# Patient Record
Sex: FEMALE | Race: WHITE | NOT HISPANIC OR LATINO | Employment: FULL TIME | URBAN - METROPOLITAN AREA
[De-identification: names, ages, dates, MRNs, and addresses within clinical notes are randomized per-mention and may not be internally consistent; named-entity substitution may affect disease eponyms.]

---

## 2017-03-11 ENCOUNTER — OFFICE VISIT (OUTPATIENT)
Dept: URGENT CARE | Facility: PHYSICIAN GROUP | Age: 22
End: 2017-03-11
Payer: COMMERCIAL

## 2017-03-11 VITALS
OXYGEN SATURATION: 97 % | RESPIRATION RATE: 16 BRPM | HEART RATE: 66 BPM | WEIGHT: 120 LBS | HEIGHT: 63 IN | BODY MASS INDEX: 21.26 KG/M2 | DIASTOLIC BLOOD PRESSURE: 62 MMHG | TEMPERATURE: 98.4 F | SYSTOLIC BLOOD PRESSURE: 104 MMHG

## 2017-03-11 DIAGNOSIS — H10.31 ACUTE BACTERIAL CONJUNCTIVITIS OF RIGHT EYE: ICD-10-CM

## 2017-03-11 PROCEDURE — 99202 OFFICE O/P NEW SF 15 MIN: CPT | Performed by: NURSE PRACTITIONER

## 2017-03-11 RX ORDER — POLYMYXIN B SULFATE AND TRIMETHOPRIM 1; 10000 MG/ML; [USP'U]/ML
1 SOLUTION OPHTHALMIC EVERY 4 HOURS
Qty: 10 ML | Refills: 0 | Status: SHIPPED | OUTPATIENT
Start: 2017-03-11 | End: 2017-03-18

## 2017-03-11 ASSESSMENT — ENCOUNTER SYMPTOMS
FEVER: 0
DIZZINESS: 0
NAUSEA: 0

## 2017-03-11 ASSESSMENT — LIFESTYLE VARIABLES: SUBSTANCE_ABUSE: 0

## 2017-03-11 NOTE — PROGRESS NOTES
"Subjective:      Aletha Nunn is a 21 y.o. female who presents with Conjunctivitis      Denies past medical, surgical or family history that is significant to today's problem.   RX or OTC medications reviewed with patient today.   No Known Allergies          Conjunctivitis  This is a new problem. The current episode started yesterday. The problem has been gradually worsening. Pertinent negatives include no fever or nausea. Associated symptoms comments: Discharge from right eye, watery, crusted shut this morning. Dry eyes, mildly blurred vision in her right eye. Pain in her right eye last night.   She does not wear contacts  .       Review of Systems   Constitutional: Negative for fever.   Gastrointestinal: Negative for nausea.   Neurological: Negative for dizziness.   Psychiatric/Behavioral: Negative for substance abuse.          Objective:     /62 mmHg  Pulse 66  Temp(Src) 36.9 °C (98.4 °F)  Resp 16  Ht 1.6 m (5' 3\")  Wt 54.432 kg (120 lb)  BMI 21.26 kg/m2  SpO2 97%     Physical Exam   Constitutional: She is oriented to person, place, and time. Vital signs are normal. She appears well-developed and well-nourished.  Non-toxic appearance. She does not appear ill.   HENT:   Head: Normocephalic.   Eyes: Conjunctivae, EOM and lids are normal. Pupils are equal, round, and reactive to light. Right eye exhibits discharge. Left eye exhibits no discharge. No scleral icterus.   Neck: Normal range of motion.   Cardiovascular: Normal rate.    Neurological: She is alert and oriented to person, place, and time.   Skin: Skin is warm.   Nursing note and vitals reviewed.              Assessment/Plan:     1. Acute bacterial conjunctivitis of right eye  polymixin-trimethoprim (POLYTRIM) 20466-2.1 UNIT/ML-% Solution       Educated in proper administration of medication(s) ordered today including safety, possible SE, risks, benefits, rationale and alternatives to therapy.   Return to clinic or PCP 2-3 days if current " symptoms are not resolving in a satisfactory manner or sooner if new or worsening symptoms occur.   Patient and or family advised differential diagnoses, signs and symptoms which would warrant Emergency Department evaluation.  Verbalizes understanding of instructions.   Pt education done. Aftercare instructions given to pt/ caregiver. Questions answered. Verbalizes good understanding.   Warms compresses to affected area 2-3 times a day for 15 min.

## 2020-04-29 ENCOUNTER — OFFICE VISIT (OUTPATIENT)
Dept: URGENT CARE | Facility: CLINIC | Age: 25
End: 2020-04-29
Payer: COMMERCIAL

## 2020-04-29 VITALS
HEART RATE: 68 BPM | SYSTOLIC BLOOD PRESSURE: 100 MMHG | RESPIRATION RATE: 14 BRPM | HEIGHT: 63 IN | OXYGEN SATURATION: 97 % | DIASTOLIC BLOOD PRESSURE: 68 MMHG | BODY MASS INDEX: 23.04 KG/M2 | WEIGHT: 130 LBS | TEMPERATURE: 97.6 F

## 2020-04-29 DIAGNOSIS — J02.0 STREP PHARYNGITIS: ICD-10-CM

## 2020-04-29 LAB
INT CON NEG: NEGATIVE
INT CON POS: POSITIVE
S PYO AG THROAT QL: POSITIVE

## 2020-04-29 PROCEDURE — 87880 STREP A ASSAY W/OPTIC: CPT | Performed by: PHYSICIAN ASSISTANT

## 2020-04-29 PROCEDURE — 99204 OFFICE O/P NEW MOD 45 MIN: CPT | Performed by: PHYSICIAN ASSISTANT

## 2020-04-29 RX ORDER — AMOXICILLIN 500 MG/1
500 CAPSULE ORAL 2 TIMES DAILY
Qty: 20 CAP | Refills: 0 | Status: SHIPPED | OUTPATIENT
Start: 2020-04-29 | End: 2020-05-09

## 2020-04-29 ASSESSMENT — ENCOUNTER SYMPTOMS
MYALGIAS: 0
SHORTNESS OF BREATH: 0
FEVER: 0
NAUSEA: 0
COUGH: 0
HEADACHES: 0
ABDOMINAL PAIN: 0
DIARRHEA: 0
VOMITING: 0
EYES NEGATIVE: 1
SORE THROAT: 1
TROUBLE SWALLOWING: 0
CHILLS: 0

## 2020-04-29 NOTE — LETTER
[facility]  [facility] [place] [id number] [facility] [id number]     April 29, 2020    Patient: Aletha Nunn   YOB: 1995   Date of Visit: 4/29/2020       To Whom It May Concern:    Aletha Nunn was seen and treated in our department on 4/29/2020. Please excuse from work 04/28/20 and 4/29/20 due to medical necessity.     Sincerely,     Rey Kellogg P.A.-C.

## 2020-04-29 NOTE — PROGRESS NOTES
"Subjective:      Aletha Nunn is a 24 y.o. female who presents with Sore Throat (left side of the neck outside theres a bump on the skin x 1.5 day and the bump showed up yesterday )        Pharyngitis    This is a new problem. Episode onset: 2 days. The problem has been gradually worsening. The pain is worse on the left side. There has been no fever. The pain is at a severity of 4/10. Associated symptoms include ear pain. Pertinent negatives include no abdominal pain, congestion, coughing, diarrhea, headaches, shortness of breath, trouble swallowing or vomiting. She has tried gargles and NSAIDs for the symptoms. The treatment provided mild relief.     Family history of younger sister having thyroid cancer. Family history of skin cancer. No personal history of cancer.     Patient also mentions a small little lump to her left neck.  She noticed the lump yesterday.  She denies any pain to this area, swelling, or redness.  She says it does not bother her.      Review of Systems   Constitutional: Negative for chills, fever and malaise/fatigue.   HENT: Positive for ear pain and sore throat. Negative for congestion and trouble swallowing.    Eyes: Negative.    Respiratory: Negative for cough and shortness of breath.    Cardiovascular: Negative for chest pain.   Gastrointestinal: Negative for abdominal pain, diarrhea, nausea and vomiting.   Musculoskeletal: Negative for myalgias.   Neurological: Negative for headaches.   Endo/Heme/Allergies: Negative.           Objective:     /68   Pulse 68   Temp 36.4 °C (97.6 °F) (Temporal)   Resp 14   Ht 1.6 m (5' 3\")   Wt 59 kg (130 lb)   SpO2 97%   BMI 23.03 kg/m²      Physical Exam  Vitals signs reviewed.   Constitutional:       General: She is not in acute distress.     Appearance: Normal appearance. She is not ill-appearing or toxic-appearing.   HENT:      Right Ear: Tympanic membrane normal.      Left Ear: Tympanic membrane normal.      Mouth/Throat:      Mouth: " Mucous membranes are moist. No oral lesions.      Tongue: No lesions.      Pharynx: Uvula midline. Posterior oropharyngeal erythema present. No pharyngeal swelling, oropharyngeal exudate or uvula swelling.      Tonsils: No tonsillar exudate or tonsillar abscesses.   Neck:      Trachea: Trachea normal.     Cardiovascular:      Rate and Rhythm: Normal rate and regular rhythm.      Heart sounds: Normal heart sounds.   Pulmonary:      Effort: Pulmonary effort is normal. No respiratory distress.      Breath sounds: Normal breath sounds. No wheezing, rhonchi or rales.   Lymphadenopathy:      Cervical: Cervical adenopathy present.      Right cervical: Superficial cervical adenopathy present.      Left cervical: Superficial cervical adenopathy present.   Skin:     General: Skin is warm and dry.   Neurological:      General: No focal deficit present.      Mental Status: She is alert and oriented to person, place, and time.   Psychiatric:         Mood and Affect: Mood normal.         Behavior: Behavior normal.       Past Medical History:   Diagnosis Date   • Left eye trauma 1998    cat scratch with cellulitis, decreased vision in that eye    No past surgical history on file.   Social History     Socioeconomic History   • Marital status: Single     Spouse name: Not on file   • Number of children: Not on file   • Years of education: Not on file   • Highest education level: Not on file   Occupational History   • Not on file   Social Needs   • Financial resource strain: Not on file   • Food insecurity     Worry: Not on file     Inability: Not on file   • Transportation needs     Medical: Not on file     Non-medical: Not on file   Tobacco Use   • Smoking status: Never Smoker   • Smokeless tobacco: Never Used   Substance and Sexual Activity   • Alcohol use: No   • Drug use: No   • Sexual activity: Never     Comment: 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    Lifestyle   • Physical activity     Days per week: Not on file      Minutes per session: Not on file   • Stress: Not on file   Relationships   • Social connections     Talks on phone: Not on file     Gets together: Not on file     Attends Islam service: Not on file     Active member of club or organization: Not on file     Attends meetings of clubs or organizations: Not on file     Relationship status: Not on file   • Intimate partner violence     Fear of current or ex partner: Not on file     Emotionally abused: Not on file     Physically abused: Not on file     Forced sexual activity: Not on file   Other Topics Concern   • Not on file   Social History Narrative   • Not on file    Patient has no known allergies.            Assessment/Plan:     1. Strep pharyngitis    - POCT Rapid Strep A - Negative   - amoxicillin (AMOXIL) 500 MG Cap; Take 1 Cap by mouth 2 times a day for 10 days.  Dispense: 20 Cap; Refill: 0    Discussed with patient her signs and symptoms are consistent with strep A pharyngitis.  Discussed the small lump to her left neck most likely a swollen lymph node secondary to strep.  If lump does not resolve after resolution of strep symptoms or persist for a while or with any worsening swelling, redness or pain to the lump, return to the urgent care for re-evaluation. In addition Follow-up with PCP.    Treatment of amoxicillin for 10 days.  Salt water gargles, ibuprofen and Tylenol for pain.  Return to urgent care if symptoms are not improving or any worsening symptoms.  Present to the emergency department or call 911 if any worsening swelling of the throat, difficulty breathing, wheezing or any other concerns.    Supportive care, differential diagnoses, and indications for immediate follow-up discussed with patient.    Pathogenesis of diagnosis discussed including typical length and natural progression. Patient expresses understanding and agrees to plan.    Please note that this dictation was created using voice recognition software. I have made every reasonable  attempt to correct obvious errors, but I expect that there are errors of grammar and possibly content that I did not discover before finalizing the note.

## 2020-06-28 ENCOUNTER — OFFICE VISIT (OUTPATIENT)
Dept: URGENT CARE | Facility: PHYSICIAN GROUP | Age: 25
End: 2020-06-28

## 2020-06-28 VITALS
DIASTOLIC BLOOD PRESSURE: 62 MMHG | BODY MASS INDEX: 24.63 KG/M2 | TEMPERATURE: 96.8 F | RESPIRATION RATE: 16 BRPM | OXYGEN SATURATION: 96 % | SYSTOLIC BLOOD PRESSURE: 104 MMHG | WEIGHT: 139 LBS | HEIGHT: 63 IN | HEART RATE: 60 BPM

## 2020-06-28 DIAGNOSIS — Z02.4 ENCOUNTER FOR CDL (COMMERCIAL DRIVING LICENSE) EXAM: ICD-10-CM

## 2020-06-28 PROCEDURE — 7100 PR DOT PHYSICAL: Performed by: NURSE PRACTITIONER

## 2020-06-28 NOTE — PROGRESS NOTES
CC) Here today for dotphysical exam.   S) Reviewed History with pt.        No complaints    O) See physical exam forms and diagnostics attached to visit today.       A)   1. Encounter for CDL (commercial driving license) exam           P) as documented on physical exam forms.

## 2024-11-07 ENCOUNTER — OFFICE VISIT (OUTPATIENT)
Dept: URGENT CARE | Facility: PHYSICIAN GROUP | Age: 29
End: 2024-11-07
Payer: COMMERCIAL

## 2024-11-07 VITALS
BODY MASS INDEX: 28.83 KG/M2 | HEART RATE: 71 BPM | HEIGHT: 63 IN | DIASTOLIC BLOOD PRESSURE: 78 MMHG | OXYGEN SATURATION: 96 % | WEIGHT: 162.7 LBS | TEMPERATURE: 99 F | RESPIRATION RATE: 16 BRPM | SYSTOLIC BLOOD PRESSURE: 110 MMHG

## 2024-11-07 DIAGNOSIS — J02.9 PHARYNGITIS, UNSPECIFIED ETIOLOGY: ICD-10-CM

## 2024-11-07 LAB — S PYO DNA SPEC NAA+PROBE: NOT DETECTED

## 2024-11-07 PROCEDURE — 3078F DIAST BP <80 MM HG: CPT | Performed by: PHYSICIAN ASSISTANT

## 2024-11-07 PROCEDURE — 99203 OFFICE O/P NEW LOW 30 MIN: CPT | Performed by: PHYSICIAN ASSISTANT

## 2024-11-07 PROCEDURE — 87651 STREP A DNA AMP PROBE: CPT | Performed by: PHYSICIAN ASSISTANT

## 2024-11-07 PROCEDURE — 3074F SYST BP LT 130 MM HG: CPT | Performed by: PHYSICIAN ASSISTANT

## 2024-11-07 ASSESSMENT — ENCOUNTER SYMPTOMS
FEVER: 0
HEADACHES: 1
SHORTNESS OF BREATH: 0
SWOLLEN GLANDS: 1
COUGH: 0
STRIDOR: 0

## 2024-11-07 NOTE — LETTER
November 7, 2024         Patient: Aletha Nunn   YOB: 1995   Date of Visit: 11/7/2024           To Whom it May Concern:    Aletha Nunn was seen in my clinic on 11/7/2024.  Please excuse her absence from school tomorrow.      Sincerely,           Karissa Omalley P.A.-C.  Electronically Signed

## 2024-11-07 NOTE — PROGRESS NOTES
"Subjective     Aletha Nunn is a 29 y.o. female who presents with Sore Throat (white tonsils, pain x 2 day)      Pharyngitis   This is a new problem. The current episode started yesterday. The problem has been unchanged. There has been no fever. The pain is moderate. Associated symptoms include congestion, headaches and swollen glands. Pertinent negatives include no coughing, shortness of breath or stridor.   Patient states that her  is having similar symptoms.  She has had strep before and symptoms feel similar.    Review of Systems   Constitutional:  Negative for fever.   HENT:  Positive for congestion.    Respiratory:  Negative for cough, shortness of breath and stridor.    Neurological:  Positive for headaches.         PMH:  has a past medical history of Left eye trauma (1998).    She has no past medical history of Allergy, ASTHMA, or GERD (gastroesophageal reflux disease).  MEDS:   Current Outpatient Medications:     hydrocodone-acetaminophen (VICODIN) 5-500 MG TABS, Take  by mouth at bedtime as needed. Take 1/2 tablet-1 tablet qhs prn severe pain (Patient not taking: Reported on 11/7/2024), Disp: 10 Each, Rfl: 0  ALLERGIES: No Known Allergies  SURGHX: No past surgical history on file.  SOCHX:  reports that she has never smoked. She has never used smokeless tobacco. She reports current alcohol use. She reports current drug use. Drugs: Marijuana and Inhaled.  FH: Family history was reviewed, no pertinent findings to report        Objective     /78   Pulse 71   Temp 37.2 °C (99 °F)   Resp 16   Ht 1.6 m (5' 3\")   Wt 73.8 kg (162 lb 11.2 oz)   SpO2 96%   BMI 28.82 kg/m²      Physical Exam  Constitutional:       Appearance: She is well-developed.   HENT:      Head: Normocephalic and atraumatic.      Right Ear: Tympanic membrane, ear canal and external ear normal.      Left Ear: Tympanic membrane, ear canal and external ear normal.      Nose: Mucosal edema present. No congestion or rhinorrhea. "      Mouth/Throat:      Lips: Pink.      Mouth: Mucous membranes are moist.      Pharynx: Uvula midline. Posterior oropharyngeal erythema (mild) present. No oropharyngeal exudate.      Tonsils: No tonsillar exudate or tonsillar abscesses. 1+ on the right. 1+ on the left.   Eyes:      Conjunctiva/sclera: Conjunctivae normal.      Pupils: Pupils are equal, round, and reactive to light.   Cardiovascular:      Rate and Rhythm: Normal rate and regular rhythm.      Heart sounds: Normal heart sounds. No murmur heard.  Pulmonary:      Effort: Pulmonary effort is normal.      Breath sounds: Normal breath sounds. No wheezing.   Musculoskeletal:      Cervical back: Normal range of motion.   Lymphadenopathy:      Cervical: Cervical adenopathy present.      Right cervical: Superficial cervical adenopathy present.   Skin:     General: Skin is warm and dry.      Capillary Refill: Capillary refill takes less than 2 seconds.   Neurological:      Mental Status: She is alert and oriented to person, place, and time.   Psychiatric:         Behavior: Behavior normal.         Judgment: Judgment normal.         POCT GROUP A STREP, PCR - Negative    Assessment & Plan     1. Pharyngitis, unspecified etiology  - POCT GROUP A STREP, PCR  PCR strep test negative.  Patient states that her 's PCR strep test was also negative.  Discussed that symptoms are likely viral in nature.  Offered to send off for a throat culture but she prefers to watch and wait and treat symptoms with over-the-counter medications and supportive care measures.          Differential Diagnosis, natural history, and supportive care discussed. Return to the Urgent Care or follow up with your PCP if symptoms fail to resolve, or for any new or worsening symptoms. Emergency room precautions discussed. Patient and/or family appears understanding of information.